# Patient Record
Sex: MALE | Race: WHITE | NOT HISPANIC OR LATINO | Employment: FULL TIME | URBAN - NONMETROPOLITAN AREA
[De-identification: names, ages, dates, MRNs, and addresses within clinical notes are randomized per-mention and may not be internally consistent; named-entity substitution may affect disease eponyms.]

---

## 2023-03-30 ENCOUNTER — OFFICE VISIT (OUTPATIENT)
Dept: PRIMARY CARE | Facility: CLINIC | Age: 28
End: 2023-03-30
Payer: COMMERCIAL

## 2023-03-30 VITALS
BODY MASS INDEX: 22.51 KG/M2 | HEART RATE: 72 BPM | WEIGHT: 152 LBS | HEIGHT: 69 IN | SYSTOLIC BLOOD PRESSURE: 128 MMHG | DIASTOLIC BLOOD PRESSURE: 82 MMHG

## 2023-03-30 DIAGNOSIS — G80.9 CEREBRAL PALSY, UNSPECIFIED TYPE (MULTI): Primary | ICD-10-CM

## 2023-03-30 DIAGNOSIS — G44.221 CHRONIC TENSION-TYPE HEADACHE, INTRACTABLE: ICD-10-CM

## 2023-03-30 DIAGNOSIS — M54.2 NECK PAIN: ICD-10-CM

## 2023-03-30 PROCEDURE — 1036F TOBACCO NON-USER: CPT

## 2023-03-30 PROCEDURE — 99203 OFFICE O/P NEW LOW 30 MIN: CPT

## 2023-03-30 RX ORDER — DICLOFENAC SODIUM 10 MG/G
4 GEL TOPICAL 4 TIMES DAILY
Qty: 100 G | Refills: 1 | Status: SHIPPED | OUTPATIENT
Start: 2023-03-30

## 2023-03-30 RX ORDER — MELOXICAM 15 MG/1
15 TABLET ORAL DAILY
Qty: 30 TABLET | Refills: 2 | Status: SHIPPED | OUTPATIENT
Start: 2023-03-30 | End: 2023-06-28

## 2023-03-30 RX ORDER — CYCLOBENZAPRINE HCL 10 MG
10 TABLET ORAL NIGHTLY PRN
Qty: 30 TABLET | Refills: 1 | Status: SHIPPED | OUTPATIENT
Start: 2023-03-30 | End: 2023-05-29

## 2023-03-30 ASSESSMENT — PATIENT HEALTH QUESTIONNAIRE - PHQ9
1. LITTLE INTEREST OR PLEASURE IN DOING THINGS: NOT AT ALL
2. FEELING DOWN, DEPRESSED OR HOPELESS: NOT AT ALL
SUM OF ALL RESPONSES TO PHQ9 QUESTIONS 1 AND 2: 0

## 2023-03-30 ASSESSMENT — ENCOUNTER SYMPTOMS
CARDIOVASCULAR NEGATIVE: 1
GASTROINTESTINAL NEGATIVE: 1
CONSTITUTIONAL NEGATIVE: 1
RESPIRATORY NEGATIVE: 1

## 2023-03-30 NOTE — PROGRESS NOTES
"Subjective   Patient ID: Abdulaziz Powers is a 27 y.o. male who presents for new pt (C/o tension and pressure in base of neck, was in ER for HA in Aug, was told to see pcp).    HPI   Works at [employer]. Used to be .   HA/NECK PAIN: Was seen in ED [facility] 7 months ago, CT head showed smaller vessels in neck than normal per patient. Had HA a little over a week ago. He endorses intermittent pressure in his occipital area. He has only experienced pain to base of skull/neck. No injury/trauma. No hx of injury/trauma to this area. He endorses his last job was very physical and this job he looks up most of the time. Ibuprofen does help somewhat with the HA. No radiculopathy to arms.  CP/SCOLIOSIS: Dx from very young age. Has not been seen for this in 10+ years, had seen Dr. Marshall in [place] for this in the past. Endorses this is stable currently, but has not been diligent with HEP/stretching.     Review of Systems   Constitutional: Negative.    Respiratory: Negative.     Cardiovascular: Negative.    Gastrointestinal: Negative.        Objective   /82   Pulse 72   Ht 1.74 m (5' 8.5\")   Wt 68.9 kg (152 lb)   BMI 22.78 kg/m²     Physical Exam  Constitutional:       General: He is not in acute distress.     Appearance: Normal appearance. He is not ill-appearing or toxic-appearing.   HENT:      Head: Normocephalic and atraumatic.   Eyes:      Extraocular Movements: Extraocular movements intact.      Conjunctiva/sclera: Conjunctivae normal.   Cardiovascular:      Rate and Rhythm: Normal rate and regular rhythm.      Heart sounds: Normal heart sounds. No murmur heard.     No gallop.   Pulmonary:      Effort: Pulmonary effort is normal.      Breath sounds: Normal breath sounds. No stridor. No wheezing.   Abdominal:      General: Bowel sounds are normal. There is no distension.      Palpations: Abdomen is soft.      Tenderness: There is no abdominal tenderness. There is no right CVA tenderness, left CVA " tenderness, guarding or rebound.   Musculoskeletal:      Cervical back: Neck supple.      Comments: ROM neck normal, no pain to palpation of occipital region or back of neck, no color change to skin   Skin:     General: Skin is warm and dry.      Findings: No erythema or rash.   Neurological:      General: No focal deficit present.      Mental Status: He is alert and oriented to person, place, and time.   Psychiatric:         Mood and Affect: Mood normal.         Behavior: Behavior normal.         Thought Content: Thought content normal.         Judgment: Judgment normal.         Assessment/Plan        We will refer to PT for both neck pain and CP.  Rx meloxicam/Flexeril/Volteran gel.  Handout for cervical stretches given.  I will review CT and ED report from [facility] when I receive them and look into small vessels in neck.  Follow up 3 months or sooner prn.

## 2023-06-26 ENCOUNTER — APPOINTMENT (OUTPATIENT)
Dept: PRIMARY CARE | Facility: CLINIC | Age: 28
End: 2023-06-26
Payer: COMMERCIAL

## 2024-01-11 ENCOUNTER — OFFICE VISIT (OUTPATIENT)
Dept: URGENT CARE | Facility: CLINIC | Age: 29
End: 2024-01-11
Payer: COMMERCIAL

## 2024-01-11 ENCOUNTER — TELEPHONE (OUTPATIENT)
Dept: URGENT CARE | Facility: CLINIC | Age: 29
End: 2024-01-11

## 2024-01-11 VITALS
SYSTOLIC BLOOD PRESSURE: 144 MMHG | BODY MASS INDEX: 23.62 KG/M2 | HEIGHT: 70 IN | WEIGHT: 165 LBS | RESPIRATION RATE: 16 BRPM | HEART RATE: 96 BPM | TEMPERATURE: 98.5 F | DIASTOLIC BLOOD PRESSURE: 80 MMHG | OXYGEN SATURATION: 98 %

## 2024-01-11 DIAGNOSIS — J02.9 ACUTE PHARYNGITIS, UNSPECIFIED ETIOLOGY: ICD-10-CM

## 2024-01-11 DIAGNOSIS — H66.90 ACUTE OTITIS MEDIA, UNSPECIFIED OTITIS MEDIA TYPE: Primary | ICD-10-CM

## 2024-01-11 LAB — POC GROUP A STREP, PCR: DETECTED

## 2024-01-11 PROCEDURE — 99213 OFFICE O/P EST LOW 20 MIN: CPT | Mod: 25 | Performed by: NURSE PRACTITIONER

## 2024-01-11 RX ORDER — AMOXICILLIN AND CLAVULANATE POTASSIUM 875; 125 MG/1; MG/1
1 TABLET, FILM COATED ORAL 2 TIMES DAILY
Qty: 20 TABLET | Refills: 0 | Status: SHIPPED | OUTPATIENT
Start: 2024-01-11 | End: 2024-01-21

## 2024-01-11 NOTE — PROGRESS NOTES
[facility]  Saloni Houhg, APRN-CNP     Visit Note - 1/11/2024 1:48 PM   This note was generated with voice recognition software and may contain errors including spelling, grammar, syntax, and misrecognization of what was dictated.    Patient: Abdulaziz Powers, MRN: [id number], 28 y.o., male   PCP: Mac Carrillo PA-C  ------------------------------------  ALLERGIES: No Known Allergies     CURRENT MEDICATIONS:   Current Outpatient Medications   Medication Instructions    amoxicillin-pot clavulanate (Augmentin) 875-125 mg tablet 875 mg, oral, 2 times daily, Take with a meal.    cyclobenzaprine (FLEXERIL) 10 mg, oral, Nightly PRN    diclofenac sodium (Voltaren) 1 % gel gel 1 Application, Topical, 4 times daily     ------------------------------------  PAST MEDICAL HX:  History of cerebral palsy; no other known health issues.      SURGICAL HX:  Past Surgical History:   Procedure Laterality Date    MUSCLE RELEASE Bilateral       FAMILY HX:   No pertinent history.   SOCIAL HX:    reports that he has never smoked. He has never used smokeless tobacco. Employed - drives [employer].   ------------------------------------  CHIEF COMPLAINT:   Chief Complaint   Patient presents with    URI     Right ear, right throat discomfort X 4 days       HISTORY OF PRESENT ILLNESS: The history was obtained from patient. Abdulaziz is a 28 y.o. male, who presents with a chief complaint of sore throat, R ear pain, and a tender lump under the R side of his jaw - sxs started on Sunday. Reports has also had mild nausea, nasal congestion, and sinus pressure, at times. Has had mild headaches, fevers/chills (Tmax 103.5f), and body aches. Denies any L ear pain, cough, wheezing/shortness of breath, dizziness/lightheadedness, vomiting, diarrhea, rashes, or other constitutional symptoms. No change in urinary status, characteristics, or frequency from baseline. No lethargy or changes in mental status. Appetite is decreased ; is able  "to eat and drink fluids without difficulty, although reports it hurts to swallow. denies loss of sense of taste or smell. Reports symptoms have gotten worse since onset. Has been taking  Ibuprofen and cold and flu medication  with some temporary relief; no other over-the-counter medications or home remedies for symptom management. No known ill contacts. Has not received the COVID vaccine. Last known COVID infection was in 11/2022 . Is not a smoker.  No known history of Rheumatic Fever.     REVIEW OF SYSTEMS:  10 systems reviewed negative with exception of history of present illness as listed above.    TODAY'S VITALS: /80   Pulse 96   Temp 36.9 °C (98.5 °F)   Resp 16   Ht 1.778 m (5' 10\")   Wt 74.8 kg (165 lb)   SpO2 98%   BMI 23.68 kg/m²     PHYSICAL EXAMINATION:  General:  Mildly ill-appearing, well nourished male; alert and oriented; in no acute distress. Sitting comfortably on exam table. Non-dyspneic.   Eyes:  Pupils equal, round and reactive to light. No conjunctival erythema; no scleral icterus.   HENT: No frontal or maxillary sinus tenderness, but mild audible nasal congestion. Airway patent, TMs and ear canals clear/unremarkable bilaterally. Nasal mucosa mildly injected and edematous. Oral mucosa moist/unremarkable. Posterior pharynx mildly erythematous; tonsils 2+ and with white exudate. Uvula is midline. Managing oral secretions without difficulty. No trismus.   Neck:  Supple. + tender, anterior cervical lymphadenopathy bilat (R>L). Trachea is midline.  Respiratory:  Respirations easy and unlabored, Breath sounds equal. Lungs are clear to auscultation and has good air movement throughout; no wheezes, rhonchi, or rales. No cough noted.  Cardiovascular:  Normal rate, Regular rhythm. Normal S1S2. No m/r/g. No peripheral edema.  Gastrointestinal:  Soft, non-tender, non-distended. No palpable masses or organomegaly. Bowel sounds normoactive.   Musculoskeletal:  Grossly normal; appropriate for age.  "    Integumentary:  Pink, warm, dry, and intact. No rashes or skin discoloration appreciated. Good skin turgor.  Neurologic:  Alert and oriented, no gross deficits.    Cognition and Speech:  Oriented, Speech clear and coherent.    Psychiatric:  Cooperative, Appropriate mood & affect.       ------------------------------------  Medical Decision Making  LABORATORY or RADIOLOGICAL IMAGING ORDERS/RESULTS:   Strep A PCR test done - results pending.     IMPRESSION/PLAN:  Course: Worsening; stable    1. Acute otitis media, unspecified otitis media type  2. Acute pharyngitis, unspecified etiology  - POCT Group A Streptococcus, PCR manually resulted  - amoxicillin-pot clavulanate (Augmentin) 875-125 mg tablet; Take 1 tablet (875 mg) by mouth 2 times a day for 10 days. Take with a meal.  Dispense: 20 tablet; Refill: 0    No red flags on exam or per history. Testing for strep done, but based on history/exam, to err on the side of caution, starting antibiotic treatment for presumptive strep pharyngitis and acute otitis media of R ear today. Urged to complete full course of medication (Augmentin BID x 10 days), even if symptoms resolve more quickly. Instructed to push fluids, rest, and to use appropriate over the counter medications as needed for management of symptoms - tylenol/ibuprofen, Cepacol, and salt water gargles may be helpful. Advised can review test results on the portal and office will contact pt within the next 24-48 hours. Reviewed instructions for self-isolation and continued monitoring. Reviewed red flags to monitor for, counseled on potential adverse reactions of treatments, expectations for improvement in sxs, and advised to follow-up with primary care provider in 2-3 days if symptoms persist, or to seek care sooner if worsening or if any additional concerns/red flags develop.  Patient agreed with plan of care; questions were encouraged and answered.       SKY Iqbal-CNP   Advanced Practice Provider    [facility]

## 2024-01-11 NOTE — TELEPHONE ENCOUNTER
Result Communication    Resulted Orders   POCT Group A Streptococcus, PCR manually resulted   Result Value Ref Range    POC Group A Strep, PCR Detected (A) Not Detected       3:05 PM      Results were successfully communicated with the patient and they acknowledged their understanding.    Patient was also reminded to replace tooth brush and oral care after 3 days of antibiotics

## 2025-02-11 ENCOUNTER — OFFICE VISIT (OUTPATIENT)
Dept: FAMILY MEDICINE CLINIC | Age: 30
End: 2025-02-11
Payer: COMMERCIAL

## 2025-02-11 VITALS
HEART RATE: 94 BPM | OXYGEN SATURATION: 99 % | SYSTOLIC BLOOD PRESSURE: 126 MMHG | WEIGHT: 149 LBS | HEIGHT: 68 IN | BODY MASS INDEX: 22.58 KG/M2 | TEMPERATURE: 99.3 F | DIASTOLIC BLOOD PRESSURE: 70 MMHG

## 2025-02-11 DIAGNOSIS — B34.9 VIRAL ILLNESS: ICD-10-CM

## 2025-02-11 DIAGNOSIS — J10.1 INFLUENZA A: Primary | ICD-10-CM

## 2025-02-11 LAB
INFLUENZA A ANTIBODY: POSITIVE
INFLUENZA B ANTIBODY: NEGATIVE
Lab: NORMAL
PERFORMING INSTRUMENT: NORMAL
QC PASS/FAIL: NORMAL
SARS-COV-2, POC: NORMAL

## 2025-02-11 PROCEDURE — 87426 SARSCOV CORONAVIRUS AG IA: CPT | Performed by: NURSE PRACTITIONER

## 2025-02-11 PROCEDURE — 87804 INFLUENZA ASSAY W/OPTIC: CPT | Performed by: NURSE PRACTITIONER

## 2025-02-11 PROCEDURE — 99203 OFFICE O/P NEW LOW 30 MIN: CPT | Performed by: NURSE PRACTITIONER

## 2025-02-11 RX ORDER — OSELTAMIVIR PHOSPHATE 75 MG/1
75 CAPSULE ORAL 2 TIMES DAILY
Qty: 10 CAPSULE | Refills: 0 | Status: SHIPPED | OUTPATIENT
Start: 2025-02-11 | End: 2025-02-16

## 2025-02-11 RX ORDER — CHLORHEXIDINE GLUCONATE ORAL RINSE 1.2 MG/ML
SOLUTION DENTAL
COMMUNITY
Start: 2024-11-14

## 2025-02-11 ASSESSMENT — ENCOUNTER SYMPTOMS
SORE THROAT: 0
WHEEZING: 0
CHEST TIGHTNESS: 0
VOMITING: 0
RHINORRHEA: 1
ABDOMINAL PAIN: 0
COUGH: 1
SHORTNESS OF BREATH: 0
DIARRHEA: 0
NAUSEA: 0
BACK PAIN: 0

## 2025-02-11 ASSESSMENT — PATIENT HEALTH QUESTIONNAIRE - PHQ9: DEPRESSION UNABLE TO ASSESS: URGENT/EMERGENT SITUATION

## 2025-02-11 NOTE — PROGRESS NOTES
Forrest Lindquist (:  1995) is a 29 y.o. male, New patient, here for evaluation of the following chief complaint(s):  Other (Fatigue, runny nose, cough, lethargic, head pressure x2days )      Vitals:    25 1216   BP: 126/70   Pulse: 94   Temp: 99.3 °F (37.4 °C)   SpO2: 99%       ASSESSMENT/PLAN:  1. Influenza A  -     oseltamivir (TAMIFLU) 75 MG capsule; Take 1 capsule by mouth 2 times daily for 5 days, Disp-10 capsule, R-0Normal  -     OTC symptom control discussed  2. Viral illness  -     POCT COVID-19, Antigen - NEG  -     POCT Influenza A/B - POS A        No follow-ups on file.      SUBJECTIVE/OBJECTIVE:    Other  This is a new problem. Episode onset: x2 days fatigue, runny nose, cough, lethargic, head pressure. The problem occurs constantly. The problem has been gradually worsening. Associated symptoms include coughing (runny nose), fatigue and headaches. Pertinent negatives include no abdominal pain, arthralgias, chest pain, chills, congestion, fever, myalgias, nausea, sore throat or vomiting. The symptoms are aggravated by exertion. He has tried acetaminophen for the symptoms.         Review of Systems   Constitutional:  Positive for fatigue. Negative for chills and fever.   HENT:  Positive for rhinorrhea. Negative for congestion, ear pain and sore throat.    Respiratory:  Positive for cough (runny nose). Negative for chest tightness, shortness of breath and wheezing.    Cardiovascular:  Negative for chest pain, palpitations and leg swelling.   Gastrointestinal:  Negative for abdominal pain, diarrhea, nausea and vomiting.   Musculoskeletal:  Negative for arthralgias, back pain and myalgias.   Neurological:  Positive for headaches. Negative for dizziness and light-headedness.         Physical Exam  Vitals reviewed.   Constitutional:       General: He is not in acute distress.     Appearance: He is well-developed.   HENT:      Right Ear: Tympanic membrane normal.      Left Ear: Tympanic

## 2025-03-07 ENCOUNTER — APPOINTMENT (OUTPATIENT)
Dept: PRIMARY CARE | Facility: CLINIC | Age: 30
End: 2025-03-07
Payer: COMMERCIAL

## 2025-07-16 DIAGNOSIS — Z13.220 LIPID SCREENING: ICD-10-CM

## 2025-07-16 DIAGNOSIS — Z13.29 THYROID DISORDER SCREEN: ICD-10-CM

## 2025-07-16 DIAGNOSIS — Z13.1 DIABETES MELLITUS SCREENING: Primary | ICD-10-CM

## 2025-07-16 DIAGNOSIS — G80.8 OTHER CEREBRAL PALSY: ICD-10-CM

## 2025-07-16 PROBLEM — M41.114 JUVENILE IDIOPATHIC SCOLIOSIS OF THORACIC REGION: Status: ACTIVE | Noted: 2025-07-16

## 2025-07-22 ENCOUNTER — HOSPITAL ENCOUNTER (EMERGENCY)
Dept: CARDIOLOGY | Facility: HOSPITAL | Age: 30
Discharge: HOME | End: 2025-07-22
Payer: COMMERCIAL

## 2025-07-22 ENCOUNTER — HOSPITAL ENCOUNTER (EMERGENCY)
Facility: HOSPITAL | Age: 30
Discharge: HOME | End: 2025-07-22
Payer: COMMERCIAL

## 2025-07-22 ENCOUNTER — APPOINTMENT (OUTPATIENT)
Dept: RADIOLOGY | Facility: HOSPITAL | Age: 30
End: 2025-07-22
Payer: COMMERCIAL

## 2025-07-22 VITALS
RESPIRATION RATE: 15 BRPM | WEIGHT: 160 LBS | HEART RATE: 62 BPM | TEMPERATURE: 98.5 F | DIASTOLIC BLOOD PRESSURE: 82 MMHG | HEIGHT: 68 IN | BODY MASS INDEX: 24.25 KG/M2 | SYSTOLIC BLOOD PRESSURE: 123 MMHG | OXYGEN SATURATION: 98 %

## 2025-07-22 DIAGNOSIS — R07.9 INTERMITTENT CHEST PAIN: ICD-10-CM

## 2025-07-22 DIAGNOSIS — R55 PRE-SYNCOPE: Primary | ICD-10-CM

## 2025-07-22 LAB
ALBUMIN SERPL BCP-MCNC: 4.6 G/DL (ref 3.4–5)
ALP SERPL-CCNC: 59 U/L (ref 33–120)
ALT SERPL W P-5'-P-CCNC: 32 U/L (ref 10–52)
ANION GAP SERPL CALC-SCNC: 10 MMOL/L (ref 10–20)
APTT PPP: 35 SECONDS (ref 26–36)
AST SERPL W P-5'-P-CCNC: 26 U/L (ref 9–39)
BASOPHILS # BLD AUTO: 0.05 X10*3/UL (ref 0–0.1)
BASOPHILS NFR BLD AUTO: 0.7 %
BILIRUB SERPL-MCNC: 0.6 MG/DL (ref 0–1.2)
BUN SERPL-MCNC: 12 MG/DL (ref 6–23)
CALCIUM SERPL-MCNC: 9.7 MG/DL (ref 8.6–10.3)
CARDIAC TROPONIN I PNL SERPL HS: <3 NG/L (ref 0–20)
CARDIAC TROPONIN I PNL SERPL HS: <3 NG/L (ref 0–20)
CHLORIDE SERPL-SCNC: 105 MMOL/L (ref 98–107)
CO2 SERPL-SCNC: 28 MMOL/L (ref 21–32)
CREAT SERPL-MCNC: 0.8 MG/DL (ref 0.5–1.3)
D DIMER PPP FEU-MCNC: <215 NG/ML FEU
EGFRCR SERPLBLD CKD-EPI 2021: >90 ML/MIN/1.73M*2
EOSINOPHIL # BLD AUTO: 0.1 X10*3/UL (ref 0–0.7)
EOSINOPHIL NFR BLD AUTO: 1.4 %
ERYTHROCYTE [DISTWIDTH] IN BLOOD BY AUTOMATED COUNT: 12.3 % (ref 11.5–14.5)
GLUCOSE SERPL-MCNC: 110 MG/DL (ref 74–99)
HCT VFR BLD AUTO: 44.9 % (ref 41–52)
HGB BLD-MCNC: 15.6 G/DL (ref 13.5–17.5)
IMM GRANULOCYTES # BLD AUTO: 0.01 X10*3/UL (ref 0–0.7)
IMM GRANULOCYTES NFR BLD AUTO: 0.1 % (ref 0–0.9)
INR PPP: 1 (ref 0.9–1.1)
LYMPHOCYTES # BLD AUTO: 2.09 X10*3/UL (ref 1.2–4.8)
LYMPHOCYTES NFR BLD AUTO: 30 %
MAGNESIUM SERPL-MCNC: 2.09 MG/DL (ref 1.6–2.4)
MCH RBC QN AUTO: 31.8 PG (ref 26–34)
MCHC RBC AUTO-ENTMCNC: 34.7 G/DL (ref 32–36)
MCV RBC AUTO: 92 FL (ref 80–100)
MONOCYTES # BLD AUTO: 0.61 X10*3/UL (ref 0.1–1)
MONOCYTES NFR BLD AUTO: 8.8 %
NEUTROPHILS # BLD AUTO: 4.11 X10*3/UL (ref 1.2–7.7)
NEUTROPHILS NFR BLD AUTO: 59 %
NRBC BLD-RTO: 0 /100 WBCS (ref 0–0)
PLATELET # BLD AUTO: 258 X10*3/UL (ref 150–450)
POTASSIUM SERPL-SCNC: 3.8 MMOL/L (ref 3.5–5.3)
PROT SERPL-MCNC: 7.3 G/DL (ref 6.4–8.2)
PROTHROMBIN TIME: 10.9 SECONDS (ref 9.8–12.4)
RBC # BLD AUTO: 4.9 X10*6/UL (ref 4.5–5.9)
SODIUM SERPL-SCNC: 139 MMOL/L (ref 136–145)
WBC # BLD AUTO: 7 X10*3/UL (ref 4.4–11.3)

## 2025-07-22 PROCEDURE — 99284 EMERGENCY DEPT VISIT MOD MDM: CPT

## 2025-07-22 PROCEDURE — 84484 ASSAY OF TROPONIN QUANT: CPT | Performed by: PHYSICIAN ASSISTANT

## 2025-07-22 PROCEDURE — 71045 X-RAY EXAM CHEST 1 VIEW: CPT

## 2025-07-22 PROCEDURE — 85025 COMPLETE CBC W/AUTO DIFF WBC: CPT | Performed by: PHYSICIAN ASSISTANT

## 2025-07-22 PROCEDURE — 83735 ASSAY OF MAGNESIUM: CPT | Performed by: PHYSICIAN ASSISTANT

## 2025-07-22 PROCEDURE — 85730 THROMBOPLASTIN TIME PARTIAL: CPT | Performed by: PHYSICIAN ASSISTANT

## 2025-07-22 PROCEDURE — 36415 COLL VENOUS BLD VENIPUNCTURE: CPT | Performed by: PHYSICIAN ASSISTANT

## 2025-07-22 PROCEDURE — 85379 FIBRIN DEGRADATION QUANT: CPT | Performed by: PHYSICIAN ASSISTANT

## 2025-07-22 PROCEDURE — 93005 ELECTROCARDIOGRAM TRACING: CPT

## 2025-07-22 PROCEDURE — 85610 PROTHROMBIN TIME: CPT | Performed by: PHYSICIAN ASSISTANT

## 2025-07-22 PROCEDURE — 99285 EMERGENCY DEPT VISIT HI MDM: CPT | Mod: 25

## 2025-07-22 PROCEDURE — 71045 X-RAY EXAM CHEST 1 VIEW: CPT | Mod: FOREIGN READ | Performed by: RADIOLOGY

## 2025-07-22 PROCEDURE — 80053 COMPREHEN METABOLIC PANEL: CPT | Performed by: PHYSICIAN ASSISTANT

## 2025-07-22 ASSESSMENT — HEART SCORE
TROPONIN: LESS THAN OR EQUAL TO NORMAL LIMIT
HISTORY: SLIGHTLY SUSPICIOUS
HEART SCORE: 1
RISK FACTORS: 1-2 RISK FACTORS
ECG: NORMAL
AGE: <45

## 2025-07-22 ASSESSMENT — ENCOUNTER SYMPTOMS
COLOR CHANGE: 0
ARTHRALGIAS: 0
FEVER: 0
VOMITING: 0
BACK PAIN: 0
CHILLS: 0
EYE PAIN: 0
ABDOMINAL PAIN: 0
DYSURIA: 0
SEIZURES: 0
SHORTNESS OF BREATH: 0
COUGH: 0
SORE THROAT: 0
PALPITATIONS: 0
HEMATURIA: 0

## 2025-07-22 ASSESSMENT — PAIN - FUNCTIONAL ASSESSMENT: PAIN_FUNCTIONAL_ASSESSMENT: 0-10

## 2025-07-22 ASSESSMENT — PAIN SCALES - GENERAL
PAINLEVEL_OUTOF10: 0 - NO PAIN

## 2025-07-22 NOTE — ED PROVIDER NOTES
Patient is a 29-year-old male who presents to the emergency room with a chief complaint of feeling lightheaded and midsternal chest pain.  Patient states that today while he was doing dishes he looked down and noticed that his feet were bluish-purple bilaterally.  He states that he felt as if he was going to pass out and as a result decided to sit on the ground.  He never had a syncopal episode but felt near syncopal.  He states that he also had midsternal chest pain and has had midsternal chest pain for about a year intermittently.  He has never been evaluated by cardiology.  He states that his symptoms regarding his feet turning color have been present for approximately 5 to 6 years.  No shortness of breath.  No fever, chills, nausea, vomiting or abdominal pain.  He denies any loss of sensation to his feet.           Review of Systems   Constitutional:  Negative for chills and fever.   HENT:  Negative for ear pain and sore throat.    Eyes:  Negative for pain and visual disturbance.   Respiratory:  Negative for cough and shortness of breath.    Cardiovascular:  Negative for chest pain and palpitations.   Gastrointestinal:  Negative for abdominal pain and vomiting.   Genitourinary:  Negative for dysuria and hematuria.   Musculoskeletal:  Negative for arthralgias and back pain.   Skin:  Negative for color change and rash.   Neurological:  Negative for seizures and syncope.   All other systems reviewed and are negative.       Physical Exam  Vitals and nursing note reviewed.   Constitutional:       General: He is not in acute distress.     Appearance: Normal appearance. He is well-developed.   HENT:      Head: Normocephalic and atraumatic.      Nose: No congestion or rhinorrhea.      Mouth/Throat:      Mouth: Mucous membranes are moist.     Eyes:      Extraocular Movements: Extraocular movements intact.      Conjunctiva/sclera: Conjunctivae normal.       Cardiovascular:      Rate and Rhythm: Normal rate and regular  rhythm.      Heart sounds: No murmur heard.  Pulmonary:      Effort: Pulmonary effort is normal. No respiratory distress.      Breath sounds: Normal breath sounds. No wheezing or rales.   Abdominal:      General: There is no distension.      Palpations: Abdomen is soft. There is no mass.      Tenderness: There is no abdominal tenderness. There is no guarding or rebound.      Hernia: No hernia is present.     Musculoskeletal:         General: No swelling.      Cervical back: Normal range of motion and neck supple. No rigidity.     Skin:     General: Skin is warm and dry.      Capillary Refill: Capillary refill takes less than 2 seconds.     Neurological:      General: No focal deficit present.      Mental Status: He is alert.     Psychiatric:         Mood and Affect: Mood normal.          Labs Reviewed   COMPREHENSIVE METABOLIC PANEL - Abnormal       Result Value    Glucose 110 (*)     Sodium 139      Potassium 3.8      Chloride 105      Bicarbonate 28      Anion Gap 10      Urea Nitrogen 12      Creatinine 0.80      eGFR >90      Calcium 9.7      Albumin 4.6      Alkaline Phosphatase 59      Total Protein 7.3      AST 26      Bilirubin, Total 0.6      ALT 32     MAGNESIUM - Normal    Magnesium 2.09     APTT - Normal    aPTT 35      Narrative:     The APTT is no longer used for monitoring Unfractionated Heparin Therapy. For monitoring Heparin Therapy, use the Heparin Assay.   PROTIME-INR - Normal    Protime 10.9      INR 1.0     D-DIMER, VTE EXCLUSION - Normal    D-Dimer, Quantitative VTE Exclusion <215      Narrative:     The VTE Exclusion D-Dimer assay is reported in ng/mL Fibrinogen Equivalent Units (FEU).    Per 's instructions for use, a value of less than 500 ng/mL (FEU) may help to exclude DVT or PE in outpatients when the assay is used with a clinical pretest probability assessment.(AEMR must utilize and document eCalc 'Wells Score Deep Vein Thrombosis Risk' for DVT exclusion only. Emergency  Department should utilize  Guidelines for Emergency Department Use of the VTE Exclusion D-Dimer and Clinical Pretest probability assessment model for DVT or PE exclusion.)   SERIAL TROPONIN-INITIAL - Normal    Troponin I, High Sensitivity <3      Narrative:     Less than 99th percentile of normal range cutoff-  Female and children under 18 years old <14 ng/L; Male <21 ng/L: Negative  Repeat testing should be performed if clinically indicated.     Female and children under 18 years old 14-50 ng/L; Male 21-50 ng/L:  Consistent with possible cardiac damage and possible increased clinical   risk. Serial measurements may help to assess extent of myocardial damage.     >50 ng/L: Consistent with cardiac damage, increased clinical risk and  myocardial infarction. Serial measurements may help assess extent of   myocardial damage.      NOTE: Children less than 1 year old may have higher baseline troponin   levels and results should be interpreted in conjunction with the overall   clinical context.     NOTE: Troponin I testing is performed using a different   testing methodology at [facility] than at other   [facility]. Direct result comparisons should only   be made within the same method.   SERIAL TROPONIN, 1 HOUR - Normal    Troponin I, High Sensitivity <3      Narrative:     Less than 99th percentile of normal range cutoff-  Female and children under 18 years old <14 ng/L; Male <21 ng/L: Negative  Repeat testing should be performed if clinically indicated.     Female and children under 18 years old 14-50 ng/L; Male 21-50 ng/L:  Consistent with possible cardiac damage and possible increased clinical   risk. Serial measurements may help to assess extent of myocardial damage.     >50 ng/L: Consistent with cardiac damage, increased clinical risk and  myocardial infarction. Serial measurements may help assess extent of   myocardial damage.      NOTE: Children less than 1 year old may have higher baseline  troponin   levels and results should be interpreted in conjunction with the overall   clinical context.     NOTE: Troponin I testing is performed using a different   testing methodology at [facility] than at other   [facility] hospitals. Direct result comparisons should only   be made within the same method.   TROPONIN SERIES- (INITIAL, 1 HR)    Narrative:     The following orders were created for panel order Troponin I Series, High Sensitivity (0, 1 HR).  Procedure                               Abnormality         Status                     ---------                               -----------         ------                     Troponin I, High Sensiti...[[id number]]  Normal              Final result               Troponin, High Sensitivi...[[id number]]  Normal              Final result                 Please view results for these tests on the individual orders.   CBC WITH AUTO DIFFERENTIAL    WBC 7.0      nRBC 0.0      RBC 4.90      Hemoglobin 15.6      Hematocrit 44.9      MCV 92      MCH 31.8      MCHC 34.7      RDW 12.3      Platelets 258      Neutrophils % 59.0      Immature Granulocytes %, Automated 0.1      Lymphocytes % 30.0      Monocytes % 8.8      Eosinophils % 1.4      Basophils % 0.7      Neutrophils Absolute 4.11      Immature Granulocytes Absolute, Automated 0.01      Lymphocytes Absolute 2.09      Monocytes Absolute 0.61      Eosinophils Absolute 0.10      Basophils Absolute 0.05          XR chest 1 view   Final Result   No acute cardiopulmonary findings.   Signed by Dhruv Hawthorne MD           ECG 12 Lead    Performed by: Kimberly Herzog PA-C  Authorized by: Kimberly Herzog PA-C    ECG interpreted by ED Physician in the absence of a cardiologist: yes    Comments:      EKG shows sinus bradycardia with a rate of 50 bpm.  No ST elevation.  RI interval is 126 ms and QRS duration is 108 ms.  EKG interpretation per myself, Kimberly Herzog  ECG 12 Lead    Performed by: Kimberly  FABIO Herzog  Authorized by: Kimberly Herzog PA-C    ECG interpreted by ED Physician in the absence of a cardiologist: yes    Comments:      Repeat EKG shows normal sinus rhythm with a rate of 65 bpm.  No ST elevation.  AK interval is 130 ms and QRS duration is 98 ms.  EKG interpretation per myself, Kimberly Herzog       Medical Decision Making  Patient is a 29-year-old male who presents to the emergency room with a chief complaint of feeling lightheaded and midsternal chest pain.  Cardiac workup is unremarkable including a normal D-dimer.  Chest x-ray shows no acute process.  In addition patient complains of color change to his bilateral lower extremities intermittently over the past 5 to 6 years.  Strong palpable pulse noted to left lower extremity.  Pulse was more difficult to find to the right lower extremity but was strong noted on Doppler.  Patient is neurovascularly intact.  Has normal sensation, warmth, good color, normal capillary refill to bilateral lower extremities.  No concern for an arterial occlusion.  Patient has been discharged home with recommended follow-up with cardiology and vascular.  Instructed to return for any new or worsening symptoms.    DDX includes but not limited to: dehydration, electrolyte abnormality, ACS, pneumonia, PE/blood clot, anxiety    HEART SCORE: 1    Amount and/or Complexity of Data Reviewed  Labs: ordered. Decision-making details documented in ED Course.  Radiology: ordered. Decision-making details documented in ED Course.     Details: 1 view chest x-ray per my interpretation shows no infiltrate or effusion         Diagnoses as of 07/22/25 2122   Pre-syncope   Intermittent chest pain                    Kimberly Herzog PA-C  07/22/25 2122

## 2025-07-22 NOTE — Clinical Note
Abdulaziz Powers was seen and treated in our emergency department on 7/22/2025.  He may return to work on 07/24/2025.       If you have any questions or concerns, please don't hesitate to call.      Kimberly Herzog PA-C

## 2025-07-23 LAB
ATRIAL RATE: 58 BPM
ATRIAL RATE: 65 BPM
P AXIS: 60 DEGREES
P AXIS: 66 DEGREES
P OFFSET: 194 MS
P OFFSET: 196 MS
P ONSET: 151 MS
P ONSET: 152 MS
PR INTERVAL: 126 MS
PR INTERVAL: 130 MS
Q ONSET: 215 MS
Q ONSET: 216 MS
QRS COUNT: 11 BEATS
QRS COUNT: 9 BEATS
QRS DURATION: 108 MS
QRS DURATION: 98 MS
QT INTERVAL: 394 MS
QT INTERVAL: 412 MS
QTC CALCULATION(BAZETT): 404 MS
QTC CALCULATION(BAZETT): 409 MS
QTC FREDERICIA: 404 MS
QTC FREDERICIA: 407 MS
R AXIS: 86 DEGREES
R AXIS: 90 DEGREES
T AXIS: 43 DEGREES
T AXIS: 47 DEGREES
T OFFSET: 413 MS
T OFFSET: 421 MS
VENTRICULAR RATE: 58 BPM
VENTRICULAR RATE: 65 BPM

## 2025-07-31 ENCOUNTER — OFFICE VISIT (OUTPATIENT)
Dept: VASCULAR SURGERY | Facility: CLINIC | Age: 30
End: 2025-07-31
Payer: COMMERCIAL

## 2025-07-31 VITALS
HEART RATE: 79 BPM | WEIGHT: 150 LBS | DIASTOLIC BLOOD PRESSURE: 78 MMHG | SYSTOLIC BLOOD PRESSURE: 128 MMHG | HEIGHT: 68 IN | OXYGEN SATURATION: 98 % | BODY MASS INDEX: 22.73 KG/M2

## 2025-07-31 DIAGNOSIS — Z86.79 HISTORY OF CARDIAC MURMUR IN CHILDHOOD: Primary | ICD-10-CM

## 2025-07-31 PROCEDURE — 99204 OFFICE O/P NEW MOD 45 MIN: CPT | Performed by: NURSE PRACTITIONER

## 2025-07-31 PROCEDURE — 99214 OFFICE O/P EST MOD 30 MIN: CPT | Performed by: NURSE PRACTITIONER

## 2025-07-31 PROCEDURE — 3008F BODY MASS INDEX DOCD: CPT | Performed by: NURSE PRACTITIONER

## 2025-07-31 NOTE — PROGRESS NOTES
"Subjective   Abdulaizz Powers is a 29 y.o. male.    Chief Complaint:      HPI  Presented to [facility] ED with chest pain, pedal discoloration, and lightheadedness. No acute findings.     PMHx: Cerebral Palsy, ?heart murmur  PSHx: Cerebral palsy related surgery  Social Hx: Never smoker. Hx of Alcohol Use Disorder, quit 2 weeks ago. Denies illicit drug use. Hx od excessive caffeine use (3 Redbulls/day), currently drinking 16 ounces of iced coffee/day.   Family Hx: Maternal Father-  / valvular issue @ 49 yo. Dad - Htn. Mother- Asthma. Brother- HTN. 3 kids alive & well.     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 Denies associated symptoms. Lasting 1.5-4 hours. No aggravating/alleviating factors. No GI symptoms (ie heartburn). Denies palpitations. Denies SOB on exertion. Denies orthopnea/PND. Denies any additional episodes of lightheadedness, dizziness, and syncope. Denies edema. No medications. Endorses intermittent RLE paresthesias. Endorses bilateral pedal discoloration purple with prolonged sitting/standing. Denies BLE pain. Denies BLE edema. Establishing with PCP tomorrow.     ROS    Objective   Visit Vitals  /78 (BP Location: Right arm, Patient Position: Sitting)   Pulse 79   Ht 1.727 m (5' 8\")   Wt 68 kg (150 lb)   SpO2 98%   BMI 22.81 kg/m²   Smoking Status Never   BSA 1.81 m²     Physical Exam    Lab Review:   Lab Results   Component Value Date     2025    K 3.8 2025     2025    CO2 28 2025    BUN 12 2025    CREATININE 0.80 2025    GLUCOSE 110 (H) 2025    CALCIUM 9.7 2025     Lab Results   Component Value Date    WBC 7.0 2025    HGB 15.6 2025    HCT 44.9 2025    MCV 92 2025     2025     No results found for: \"CHOL\", \"TRIG\", \"HDL\", \"LDLDIRECT\"    Assessment/Plan   Atypical Chest Pain  EKG NSR. Trop negative.   Hx of " cardiac murmur. Family hx of valvular disease. RRR on exam. No appreciable murmur. Order for TTE.    locally. Referred to [facility]rec[facility] for follow up.

## 2025-07-31 NOTE — PATIENT INSTRUCTIONS
Schedule echocardiogram and virtual visit follow up.  Have blood work completed and establish care with primary care provider.

## 2025-08-01 ENCOUNTER — APPOINTMENT (OUTPATIENT)
Dept: PRIMARY CARE | Facility: CLINIC | Age: 30
End: 2025-08-01
Payer: COMMERCIAL

## 2025-08-01 ENCOUNTER — TELEPHONE (OUTPATIENT)
Dept: PRIMARY CARE | Facility: CLINIC | Age: 30
End: 2025-08-01

## 2025-08-01 VITALS
WEIGHT: 148 LBS | SYSTOLIC BLOOD PRESSURE: 124 MMHG | BODY MASS INDEX: 22.43 KG/M2 | HEART RATE: 59 BPM | HEIGHT: 68 IN | DIASTOLIC BLOOD PRESSURE: 80 MMHG

## 2025-08-01 DIAGNOSIS — G80.9 CEREBRAL PALSY, UNSPECIFIED TYPE (MULTI): Primary | ICD-10-CM

## 2025-08-01 DIAGNOSIS — M54.50 CHRONIC RIGHT-SIDED LOW BACK PAIN WITHOUT SCIATICA: ICD-10-CM

## 2025-08-01 DIAGNOSIS — Z76.89 ESTABLISHING CARE WITH NEW DOCTOR, ENCOUNTER FOR: ICD-10-CM

## 2025-08-01 DIAGNOSIS — G89.29 CHRONIC RIGHT-SIDED LOW BACK PAIN WITHOUT SCIATICA: ICD-10-CM

## 2025-08-01 DIAGNOSIS — I73.89 OTHER SPECIFIED PERIPHERAL VASCULAR DISEASES: ICD-10-CM

## 2025-08-01 DIAGNOSIS — K21.9 GASTROESOPHAGEAL REFLUX DISEASE, UNSPECIFIED WHETHER ESOPHAGITIS PRESENT: ICD-10-CM

## 2025-08-01 PROBLEM — G80.8 OTHER CEREBRAL PALSY: Status: RESOLVED | Noted: 2025-07-16 | Resolved: 2025-08-01

## 2025-08-01 PROCEDURE — 3008F BODY MASS INDEX DOCD: CPT | Performed by: NURSE PRACTITIONER

## 2025-08-01 PROCEDURE — 1036F TOBACCO NON-USER: CPT | Performed by: NURSE PRACTITIONER

## 2025-08-01 PROCEDURE — 99204 OFFICE O/P NEW MOD 45 MIN: CPT | Performed by: NURSE PRACTITIONER

## 2025-08-01 RX ORDER — MELOXICAM 7.5 MG/1
7.5 TABLET ORAL DAILY
Qty: 90 TABLET | Refills: 3 | Status: SHIPPED | OUTPATIENT
Start: 2025-08-01 | End: 2026-08-01

## 2025-08-01 RX ORDER — OMEPRAZOLE 40 MG/1
40 CAPSULE, DELAYED RELEASE ORAL
Qty: 90 CAPSULE | Refills: 3 | Status: SHIPPED | OUTPATIENT
Start: 2025-08-01 | End: 2026-08-01

## 2025-08-01 ASSESSMENT — ENCOUNTER SYMPTOMS
BLOOD IN STOOL: 0
TROUBLE SWALLOWING: 0
FLANK PAIN: 0
VOMITING: 0
COUGH: 0
JOINT SWELLING: 0
APNEA: 0
NERVOUS/ANXIOUS: 0
CONSTIPATION: 0
SEIZURES: 0
FACIAL ASYMMETRY: 0
ABDOMINAL PAIN: 0
HEMATURIA: 0
PALPITATIONS: 0
PHOTOPHOBIA: 0
CHILLS: 0
SORE THROAT: 0
FATIGUE: 0
DYSURIA: 0
NECK PAIN: 0
MYALGIAS: 0
CHEST TIGHTNESS: 0
EYE REDNESS: 0
CONFUSION: 0
WEAKNESS: 0
BACK PAIN: 1
ABDOMINAL DISTENTION: 0
NUMBNESS: 0
DIFFICULTY URINATING: 0
NAUSEA: 0
SLEEP DISTURBANCE: 0
ARTHRALGIAS: 1
WOUND: 0
HEADACHES: 0
EYE PAIN: 0
CHOKING: 0
DIARRHEA: 0
FREQUENCY: 0
UNEXPECTED WEIGHT CHANGE: 0
DIZZINESS: 0
SPEECH DIFFICULTY: 0
WHEEZING: 0
SHORTNESS OF BREATH: 0
FEVER: 0

## 2025-08-01 NOTE — PROGRESS NOTES
"Subjective   Patient ID: Abdulaziz Powers is a 29 y.o. male who presents for Establish Care (NEW PATIENT).      HPI:  Presents today TO hospitals CARE. [facility] ER FU FOR CP AND PEDAL DISCOLORATION.  STATES HE HAS BLE PEDAL DISCOLORATION, PURPLE, WITH PROLONGING STANDING/SITTING X 5 YEARS. DENIES BLE EDEMA OR PAIN. NO SOB. HE DID SE VASCULAR 7/31/25, BUT IT WAS IN [place] AND WOULD LIKE TO SEE A CLOSER PROVIDER. THEY ORDERED AN ECHO AT THAT APPT.      C/O MID-STERNAL CP ON/OFF X SEVERAL MONTHS  modifying factors consists of  HE DOES HAVE GERD.   associated symptoms consist of  NO ABD PAIN, N/V.     prior treatment consists of medication DIET CHANGES.     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 NO BOWEL OR BLADDER SYMPTOMS   prior treatment consists of medication OTC NSAIDS      Visit Vitals  /80   Pulse 59   Ht 1.727 m (5' 8\")   Wt 67.1 kg (148 lb)   BMI 22.50 kg/m²   Smoking Status Never   BSA 1.79 m²        Review of Systems   Constitutional:  Negative for chills, fatigue, fever and unexpected weight change.   HENT:  Negative for congestion, ear pain, sore throat and trouble swallowing.    Eyes:  Negative for photophobia, pain, redness and visual disturbance.   Respiratory:  Negative for apnea, cough, choking, chest tightness, shortness of breath and wheezing.    Cardiovascular:  Negative for chest pain, palpitations and leg swelling.   Gastrointestinal:  Negative for abdominal distention, abdominal pain, blood in stool, constipation, diarrhea, nausea and vomiting.   Genitourinary:  Negative for difficulty urinating, dysuria, flank pain, frequency, hematuria and urgency.   Musculoskeletal:  Positive for arthralgias and back pain. Negative for gait problem, joint swelling, myalgias and neck pain.   Skin:  Negative for rash " and wound.   Neurological:  Negative for dizziness, seizures, syncope, facial asymmetry, speech difficulty, weakness, numbness and headaches.   Psychiatric/Behavioral:  Negative for confusion, sleep disturbance and suicidal ideas. The patient is not nervous/anxious.        Objective     Physical Exam  Constitutional:       Appearance: Normal appearance. He is normal weight.   HENT:      Head: Normocephalic.     Eyes:      Extraocular Movements: Extraocular movements intact.      Conjunctiva/sclera: Conjunctivae normal.      Pupils: Pupils are equal, round, and reactive to light.       Cardiovascular:      Rate and Rhythm: Normal rate and regular rhythm.      Pulses: Normal pulses.      Heart sounds: Normal heart sounds.   Pulmonary:      Effort: Pulmonary effort is normal.      Breath sounds: Normal breath sounds.   Abdominal:      Tenderness: There is no abdominal tenderness.      Hernia: No hernia is present.      Comments: BSX4     Musculoskeletal:         General: Normal range of motion.      Cervical back: Normal range of motion.     Skin:     General: Skin is warm and dry.     Neurological:      General: No focal deficit present.      Mental Status: He is alert and oriented to person, place, and time.     Psychiatric:         Mood and Affect: Mood normal.         Behavior: Behavior normal.         Thought Content: Thought content normal.         Judgment: Judgment normal.            Assessment/Plan   Problem List Items Addressed This Visit       Cerebral palsy - Primary    Relevant Medications    meloxicam (Mobic) 7.5 mg tablet    Gastroesophageal reflux disease    Relevant Medications    omeprazole (PriLOSEC) 40 mg DR capsule    Other specified peripheral vascular diseases    Relevant Orders    Referral to Vascular Medicine    Vascular US PVR Without Exercise    Chronic right-sided low back pain without sciatica     Other Visit Diagnoses         Establishing care with new doctor, encounter for            I  WILL ORDER PVR AND REFER TO LOCAL VASCULAR PROVIDER. INSTRUCTED TO GET ECHO AND PVR, THEN FU WITH VASCULAR. DO LABS BEFORE APPT as WELL TO CHECK LIPIDS.     START OMEPRAZOLE FOR GERD AND MOBIC FOR PAIN.     WE DISCUSSED MOST COMMON SIDE EFFECTS OF PRESCRIBED MEDICATIONS. INDICATIONS, RISK, COMPLICATIONS, AND ALTERNATIVES OF MEDICATION/THERAPEUTICS WERE EXPLAINED AND DISCUSSED. PLEASE MONITOR CLOSELY FOR ANY UNTOWARD SIDE EFFECTS OR COMPLICATIONS OF MEDICATIONS. PATIENT IS STRONGLY ADVISED TO BE COMPLIANT WITH RECOMMENDATIONS. QUESTIONS AND CONCERNS WERE ADDRESSED. INSTRUCTED TO CALL, RETURN SOONER, OR GO TO THE ER,  IF SYMPTOMS PERSIST OR WORSEN. THEY VOICED UNDERSTANDING AND  DENIES FURTHER QUESTIONS AT THIS TIME.    TIME CODE  1. PREPARATION FOR PATIENT'S VISIT (REVIEWING CHART, CURRENT MEDICAL RECORDS, OUTSIDE HEALTH PROVIDER RECORDS, PREVIOUS HISTORY, EXAM, TEST, PROCEDURE, AND MEDICATIONS)  2. FACE TO FACE ENCOUNTER OBTAINING HISTORY FROM THE PATIENT/FAMILY/CAREGIVERS; PERFORMING EVALUATION AND EXAMINATION; ORDERING TESTS OR PROCEDURES; REFERRING AND COMMUNICATING WITH OTHER HEALTHCARE PROVIDERS; COUNSELING AND EDUCATION OF THE PATIENT/FAMILY/CAREGIVERS; INDEPENDENTLY INTERPRETING RESULTS (TESTS, LABS, PROCEDURES, IMAGING) AND COMMUNICATING AND EXPLAINING RESULTS TO THE PATIENT/FAMILY/CAREGIVERS  3. COORDINATION OF CARE; PREPARING AND PRINTING DISCHARGE INSTRUCTIONS AND ANY EDUCATIONAL MATERIAL FOR THE PATIENT/FAMILY/CAREGIVERS. DOCUMENTING CLINICAL INFORMATION IN THE ELECTRONIC MEDICAL RECORD   4. REVIEWING OARRS AS NEEDED    MDM  1) COMPLEXITY: MORE THAN 1 STABLE CHRONIC CONDITION ADDRESSED OR 1 ACUTE ILLNESS ADDRESSED   2)DATA: TESTS INTERPRETED AND OR ORDERED, TOOK INDEPENDENT HISTORY OR RECORDS REVIEWED  3)RISK: MODERATE RISK DUE TO NATURE OF MEDICAL CONDITIONS/COMORBIDITY OR MEDICATIONS ORDERED OR SURGICAL OR PROCEDURE REFERRAL    FU AFTER VASCULAR WITH LABS/MED CHECK

## 2025-08-02 LAB
ALBUMIN SERPL-MCNC: 4.6 G/DL (ref 3.6–5.1)
ALP SERPL-CCNC: 60 U/L (ref 36–130)
ALT SERPL-CCNC: 43 U/L (ref 9–46)
ANION GAP SERPL CALCULATED.4IONS-SCNC: 7 MMOL/L (CALC) (ref 7–17)
AST SERPL-CCNC: 25 U/L (ref 10–40)
BASOPHILS # BLD AUTO: 71 CELLS/UL (ref 0–200)
BASOPHILS NFR BLD AUTO: 0.9 %
BILIRUB SERPL-MCNC: 0.6 MG/DL (ref 0.2–1.2)
BUN SERPL-MCNC: 14 MG/DL (ref 7–25)
CALCIUM SERPL-MCNC: 9.3 MG/DL (ref 8.6–10.3)
CHLORIDE SERPL-SCNC: 104 MMOL/L (ref 98–110)
CHOLEST SERPL-MCNC: 143 MG/DL
CHOLEST/HDLC SERPL: 2.8 (CALC)
CO2 SERPL-SCNC: 29 MMOL/L (ref 20–32)
CREAT SERPL-MCNC: 0.92 MG/DL (ref 0.6–1.26)
EGFRCR SERPLBLD CKD-EPI 2021: 114 ML/MIN/1.73M2
EOSINOPHIL # BLD AUTO: 40 CELLS/UL (ref 15–500)
EOSINOPHIL NFR BLD AUTO: 0.5 %
ERYTHROCYTE [DISTWIDTH] IN BLOOD BY AUTOMATED COUNT: 12.6 % (ref 11–15)
EST. AVERAGE GLUCOSE BLD GHB EST-MCNC: 105 MG/DL
EST. AVERAGE GLUCOSE BLD GHB EST-SCNC: 5.8 MMOL/L
GLUCOSE SERPL-MCNC: 75 MG/DL (ref 65–99)
HBA1C MFR BLD: 5.3 %
HCT VFR BLD AUTO: 49 % (ref 38.5–50)
HDLC SERPL-MCNC: 52 MG/DL
HGB BLD-MCNC: 16.5 G/DL (ref 13.2–17.1)
LDLC SERPL CALC-MCNC: 75 MG/DL (CALC)
LYMPHOCYTES # BLD AUTO: 2417 CELLS/UL (ref 850–3900)
LYMPHOCYTES NFR BLD AUTO: 30.6 %
MCH RBC QN AUTO: 32.4 PG (ref 27–33)
MCHC RBC AUTO-ENTMCNC: 33.7 G/DL (ref 32–36)
MCV RBC AUTO: 96.3 FL (ref 80–100)
MONOCYTES # BLD AUTO: 814 CELLS/UL (ref 200–950)
MONOCYTES NFR BLD AUTO: 10.3 %
NEUTROPHILS # BLD AUTO: 4558 CELLS/UL (ref 1500–7800)
NEUTROPHILS NFR BLD AUTO: 57.7 %
NONHDLC SERPL-MCNC: 91 MG/DL (CALC)
PLATELET # BLD AUTO: 276 THOUSAND/UL (ref 140–400)
PMV BLD REES-ECKER: 10.1 FL (ref 7.5–12.5)
POTASSIUM SERPL-SCNC: 3.9 MMOL/L (ref 3.5–5.3)
PROT SERPL-MCNC: 7.2 G/DL (ref 6.1–8.1)
RBC # BLD AUTO: 5.09 MILLION/UL (ref 4.2–5.8)
SODIUM SERPL-SCNC: 140 MMOL/L (ref 135–146)
TRIGL SERPL-MCNC: 78 MG/DL
TSH SERPL-ACNC: 1.33 MIU/L (ref 0.4–4.5)
WBC # BLD AUTO: 7.9 THOUSAND/UL (ref 3.8–10.8)

## 2025-08-07 ENCOUNTER — HOSPITAL ENCOUNTER (OUTPATIENT)
Dept: CARDIOLOGY | Facility: HOSPITAL | Age: 30
Discharge: HOME | End: 2025-08-07
Payer: COMMERCIAL

## 2025-08-07 VITALS
BODY MASS INDEX: 22.43 KG/M2 | DIASTOLIC BLOOD PRESSURE: 79 MMHG | WEIGHT: 148 LBS | SYSTOLIC BLOOD PRESSURE: 159 MMHG | HEIGHT: 68 IN | RESPIRATION RATE: 18 BRPM | OXYGEN SATURATION: 98 % | HEART RATE: 63 BPM

## 2025-08-07 DIAGNOSIS — Z86.79 HISTORY OF CARDIAC MURMUR IN CHILDHOOD: ICD-10-CM

## 2025-08-07 DIAGNOSIS — R01.1 CARDIAC MURMUR, UNSPECIFIED: ICD-10-CM

## 2025-08-07 PROCEDURE — 93306 TTE W/DOPPLER COMPLETE: CPT

## 2025-08-07 PROCEDURE — 93306 TTE W/DOPPLER COMPLETE: CPT | Performed by: STUDENT IN AN ORGANIZED HEALTH CARE EDUCATION/TRAINING PROGRAM

## 2025-08-10 LAB
AORTIC VALVE MEAN GRADIENT: 3 MMHG
AORTIC VALVE PEAK VELOCITY: 1.26 M/S
AV PEAK GRADIENT: 6 MMHG
EJECTION FRACTION APICAL 4 CHAMBER: 63.9
EJECTION FRACTION: 58 %
LEFT ATRIUM VOLUME AREA LENGTH INDEX BSA: 9.3 ML/M2
LEFT VENTRICLE INTERNAL DIMENSION DIASTOLE: 4.81 CM (ref 3.5–6)
LV EJECTION FRACTION BIPLANE: 55 %
MITRAL VALVE E/A RATIO: 1.27

## 2025-08-11 ASSESSMENT — ENCOUNTER SYMPTOMS
PND: 0
IRREGULAR HEARTBEAT: 0
CLAUDICATION: 0
COLOR CHANGE: 1
HEARTBURN: 0
POOR WOUND HEALING: 0
LIGHT-HEADEDNESS: 0
PALPITATIONS: 0
PARESTHESIAS: 1
SYNCOPE: 0
DIZZINESS: 0
ORTHOPNEA: 0
NUMBNESS: 1

## 2025-08-14 ENCOUNTER — HOSPITAL ENCOUNTER (OUTPATIENT)
Dept: VASCULAR MEDICINE | Facility: HOSPITAL | Age: 30
Discharge: HOME | End: 2025-08-14
Payer: COMMERCIAL

## 2025-08-14 DIAGNOSIS — I73.89 OTHER SPECIFIED PERIPHERAL VASCULAR DISEASES: ICD-10-CM

## 2025-08-14 DIAGNOSIS — I73.9 PERIPHERAL VASCULAR DISEASE, UNSPECIFIED: ICD-10-CM

## 2025-08-14 PROCEDURE — 93922 UPR/L XTREMITY ART 2 LEVELS: CPT | Performed by: INTERNAL MEDICINE

## 2025-08-14 PROCEDURE — 93922 UPR/L XTREMITY ART 2 LEVELS: CPT

## 2025-08-29 ENCOUNTER — APPOINTMENT (OUTPATIENT)
Dept: CARDIOLOGY | Facility: CLINIC | Age: 30
End: 2025-08-29
Payer: COMMERCIAL

## 2025-08-29 VITALS
OXYGEN SATURATION: 97 % | WEIGHT: 150 LBS | SYSTOLIC BLOOD PRESSURE: 132 MMHG | BODY MASS INDEX: 22.73 KG/M2 | HEART RATE: 70 BPM | HEIGHT: 68 IN | DIASTOLIC BLOOD PRESSURE: 78 MMHG

## 2025-08-29 DIAGNOSIS — I73.89 ACROCYANOSIS: ICD-10-CM

## 2025-08-29 DIAGNOSIS — R20.2 NUMBNESS AND TINGLING OF RIGHT LEG: ICD-10-CM

## 2025-08-29 DIAGNOSIS — R20.0 NUMBNESS AND TINGLING OF RIGHT LEG: ICD-10-CM

## 2025-08-29 DIAGNOSIS — R07.9 CHEST PAIN, UNSPECIFIED TYPE: Primary | ICD-10-CM

## 2025-08-29 PROCEDURE — 3008F BODY MASS INDEX DOCD: CPT

## 2025-08-29 PROCEDURE — 99204 OFFICE O/P NEW MOD 45 MIN: CPT

## 2025-08-29 PROCEDURE — 1036F TOBACCO NON-USER: CPT

## 2025-09-04 ENCOUNTER — APPOINTMENT (OUTPATIENT)
Dept: PRIMARY CARE | Facility: CLINIC | Age: 30
End: 2025-09-04
Payer: COMMERCIAL

## 2025-09-08 ENCOUNTER — APPOINTMENT (OUTPATIENT)
Dept: CARDIOLOGY | Facility: CLINIC | Age: 30
End: 2025-09-08
Payer: COMMERCIAL

## 2026-08-28 ENCOUNTER — APPOINTMENT (OUTPATIENT)
Dept: CARDIOLOGY | Facility: CLINIC | Age: 31
End: 2026-08-28
Payer: COMMERCIAL